# Patient Record
Sex: MALE | Race: WHITE | NOT HISPANIC OR LATINO | URBAN - METROPOLITAN AREA
[De-identification: names, ages, dates, MRNs, and addresses within clinical notes are randomized per-mention and may not be internally consistent; named-entity substitution may affect disease eponyms.]

---

## 2018-08-14 ENCOUNTER — CONVERSION ENCOUNTER (OUTPATIENT)
Dept: GASTROENTEROLOGY | Facility: CLINIC | Age: 38
End: 2018-08-14

## 2018-08-14 ENCOUNTER — OFFICE VISIT CONVERTED (OUTPATIENT)
Dept: GASTROENTEROLOGY | Facility: CLINIC | Age: 38
End: 2018-08-14
Attending: NURSE PRACTITIONER

## 2021-05-16 VITALS
WEIGHT: 181.37 LBS | SYSTOLIC BLOOD PRESSURE: 135 MMHG | HEIGHT: 66 IN | BODY MASS INDEX: 29.15 KG/M2 | DIASTOLIC BLOOD PRESSURE: 87 MMHG

## 2024-02-29 ENCOUNTER — OFFICE VISIT (OUTPATIENT)
Dept: CARDIOLOGY | Facility: CLINIC | Age: 44
End: 2024-02-29
Payer: COMMERCIAL

## 2024-02-29 VITALS
SYSTOLIC BLOOD PRESSURE: 128 MMHG | DIASTOLIC BLOOD PRESSURE: 87 MMHG | HEIGHT: 66 IN | HEART RATE: 96 BPM | BODY MASS INDEX: 37.06 KG/M2 | WEIGHT: 230.6 LBS

## 2024-02-29 DIAGNOSIS — R06.09 DYSPNEA ON EXERTION: ICD-10-CM

## 2024-02-29 DIAGNOSIS — R07.89 CHEST DISCOMFORT: ICD-10-CM

## 2024-02-29 DIAGNOSIS — E66.01 SEVERE OBESITY (BMI 35.0-39.9) WITH COMORBIDITY: ICD-10-CM

## 2024-02-29 DIAGNOSIS — R29.818 SUSPECTED SLEEP APNEA: ICD-10-CM

## 2024-02-29 DIAGNOSIS — E78.2 MIXED DYSLIPIDEMIA: ICD-10-CM

## 2024-02-29 DIAGNOSIS — Z82.49 FAMILY HISTORY OF ISCHEMIC HEART DISEASE (IHD): ICD-10-CM

## 2024-02-29 DIAGNOSIS — I10 ESSENTIAL HYPERTENSION: Primary | ICD-10-CM

## 2024-02-29 RX ORDER — AMLODIPINE BESYLATE 10 MG/1
1 TABLET ORAL DAILY
COMMUNITY
Start: 2023-11-25

## 2024-02-29 RX ORDER — BUSPIRONE HYDROCHLORIDE 15 MG/1
15 TABLET ORAL 3 TIMES DAILY
COMMUNITY

## 2024-02-29 RX ORDER — ROSUVASTATIN CALCIUM 10 MG/1
TABLET, COATED ORAL
COMMUNITY
Start: 2024-02-26

## 2024-02-29 RX ORDER — LOSARTAN POTASSIUM 25 MG/1
TABLET ORAL
COMMUNITY
Start: 2024-02-26

## 2024-02-29 RX ORDER — DULOXETIN HYDROCHLORIDE 60 MG/1
60 CAPSULE, DELAYED RELEASE ORAL 2 TIMES DAILY
COMMUNITY

## 2024-02-29 RX ORDER — ALBUTEROL SULFATE 90 UG/1
AEROSOL, METERED RESPIRATORY (INHALATION)
COMMUNITY
Start: 2024-01-22

## 2024-02-29 NOTE — PROGRESS NOTES
"Chief Complaint  Shortness of Breath, Edema, Hypertension, and Hyperlipidemia      History of Present Illness  James Roa presents to [facility] CARDIOLOGY    This is a very pleasant 43-year-old gentleman with morbid obesity, family history of ischemic heart disease, hypertension and dyslipidemia presents to clinic for cardiac evaluation.  He has been having sporadic episodes of anterior chest discomfort.  It has been stable in pattern.  It is self-limited.  He denies aggravating or relieving factors.  He has dyspnea on moderate effort.  He has no palpitations, presyncope or syncope.    Past medical history: As discussed above.      Current Outpatient Medications:     albuterol sulfate  (90 Base) MCG/ACT inhaler, , Disp: , Rfl:     amLODIPine (NORVASC) 10 MG tablet, Take 1 tablet by mouth Daily., Disp: , Rfl:     busPIRone (BUSPAR) 15 MG tablet, Take 1 tablet by mouth 3 (Three) Times a Day., Disp: , Rfl:     DULoxetine (CYMBALTA) 60 MG capsule, Take 1 capsule by mouth 2 (Two) Times a Day., Disp: , Rfl:     losartan (COZAAR) 25 MG tablet, , Disp: , Rfl:     rosuvastatin (CRESTOR) 10 MG tablet, , Disp: , Rfl:     There are no discontinued medications.  Allergies   Allergen Reactions    Cefaclor Other (See Comments)    Theophylline Other (See Comments)        Social History     Tobacco Use    Smoking status: Former     Types: Cigarettes    Smokeless tobacco: Never   Vaping Use    Vaping Use: Former    Substances: THC   Substance Use Topics    Alcohol use: Defer    Drug use: Never       Family History   Problem Relation Age of Onset    Heart attack Mother     Heart attack Maternal Grandfather         Objective     /87   Pulse 96   Ht 167.6 cm (65.98\")   Wt 105 kg (230 lb 9.6 oz)   BMI 37.24 kg/m²       Physical Exam  Constitutional:       General: Awake. Not in acute distress.     Appearance: Normal appearance.   Neck:      Vascular: No carotid bruit, hepatojugular reflux or " "JVD.   Cardiovascular:      Rate and Rhythm: Normal rate and regular rhythm.      Chest Wall: PMI is not displaced.      Heart sounds: Normal heart sounds, S1 normal and S2 normal. No murmur heard.   No friction rub. No gallop. No S3 or S4 sounds.    Pulmonary:      Effort: Pulmonary effort is normal.      Breath sounds: Normal breath sounds. No wheezing, rhonchi or rales.   Ext.    No edema.   Skin:     General: Skin is warm and dry.      Coloration: Skin is not cyanotic.      Findings: No petechiae or rash.   Neurological:      Mental Status: Alert and oriented x 3  Psychiatric:         Behavior: Behavior is cooperative.       Result Review :     No results found for: \"PROBNP\"       No results found for: \"TSH\"   No results found for: \"FREET4\"   No results found for: \"DDIMERQUANT\"  No results found for: \"MG\"   No results found for: \"DIGOXIN\"   No results found for: \"TROPONINT\"   No results found for: \"POCTROP\"(              ECG 12 Lead    Date/Time: 2/29/2024 12:14 PM  Performed by: Vicente Waggoner MD    Authorized by: Vicente Waggoner MD  Previous ECG: no previous ECG available  Rhythm: sinus rhythm  BPM: 41  Other findings: early transition            No results found for this or any previous visit.                Diagnoses and all orders for this visit:    1. Essential hypertension (Primary)    2. Mixed dyslipidemia    3. Suspected sleep apnea  -     Ambulatory Referral to Sleep Medicine    4. Chest discomfort  -     Adult Transthoracic Echo Complete W/ Cont if Necessary Per Protocol; Future  -     Treadmill Stress Test; Future    5. Dyspnea on exertion  -     Adult Transthoracic Echo Complete W/ Cont if Necessary Per Protocol; Future  -     Treadmill Stress Test; Future    6. Family history of ischemic heart disease (IHD)    7. Severe obesity (BMI 35.0-39.9) with comorbidity    Other orders  -     ECG 12 Lead      Assessment:    Chest discomfort/dyspnea: He has been having sporadic episodes of atypical " chest discomfort and dyspnea on moderate effort as described in HPI.  His exam is benign.  His ECG shows normal sinus rhythm.  He will be scheduled for treadmill stress test to assess for ischemic ST-T changes.  Echo will be done for LVEF, valvular function and PA systolic pressure.  Further recommendations to follow.    Essential hypertension: Stable on current regimen piquantly the same.    Suspected sleep apnea: He will be referred to sleep medicine for further evaluation.    Mixed dyslipidemia: On statin therapy which was recently started.  Continue the same.  Repeat lipid profile in 2 months for follow-up.    Obesity: Lifestyle changes including regular exercise, dietary changes and weight loss were recommended.    Follow Up       Return for Return to clinic after diagnostic testing, With Ernestina JACKSON.    Patient was given instructions and counseling regarding his condition or for health maintenance advice. Please see specific information pulled into the AVS if appropriate.

## 2024-03-11 ENCOUNTER — OFFICE VISIT (OUTPATIENT)
Dept: SLEEP MEDICINE | Facility: HOSPITAL | Age: 44
End: 2024-03-11
Payer: COMMERCIAL

## 2024-03-11 VITALS
HEIGHT: 66 IN | WEIGHT: 231.8 LBS | BODY MASS INDEX: 37.25 KG/M2 | OXYGEN SATURATION: 96 % | HEART RATE: 97 BPM | SYSTOLIC BLOOD PRESSURE: 115 MMHG | DIASTOLIC BLOOD PRESSURE: 74 MMHG

## 2024-03-11 DIAGNOSIS — I10 ESSENTIAL HYPERTENSION, BENIGN: ICD-10-CM

## 2024-03-11 DIAGNOSIS — G47.19 EXCESSIVE DAYTIME SLEEPINESS: ICD-10-CM

## 2024-03-11 DIAGNOSIS — G47.8 NON-RESTORATIVE SLEEP: ICD-10-CM

## 2024-03-11 DIAGNOSIS — G47.30 OBSERVED SLEEP APNEA: Primary | ICD-10-CM

## 2024-03-11 DIAGNOSIS — E66.9 CLASS 2 OBESITY: ICD-10-CM

## 2024-03-11 DIAGNOSIS — R06.83 SNORING: ICD-10-CM

## 2024-03-11 PROBLEM — E66.812 CLASS 2 OBESITY: Status: ACTIVE | Noted: 2024-03-11

## 2024-03-11 PROCEDURE — G0463 HOSPITAL OUTPT CLINIC VISIT: HCPCS

## 2024-03-11 PROCEDURE — 99204 OFFICE O/P NEW MOD 45 MIN: CPT | Performed by: INTERNAL MEDICINE

## 2024-03-11 RX ORDER — LISINOPRIL 5 MG/1
5 TABLET ORAL DAILY
COMMUNITY

## 2024-03-11 NOTE — PROGRESS NOTES
[facility] [address] [address]  Phone: [phone]  Fax: [phone]      James Roa  [id number]   1980  43 y.o.  male      Referring physician/provider, Dr Vicente Waggoner MD  PCP Ivette Montero APRN    Type of service: Initial Sleep Medicine Consult.  Date of service: 3/11/2024      Chief Complaint   Patient presents with    Witnessed Apnea    Snoring    Non-restorative Sleep    Fatigue    Dry Mouth    Daytime Sleepiness    Obesity       History of present illness;  Thank you for asking to see James Roa, 43 y.o.. The patient was seen today on 3/11/2024 at [facility].  The patient presents today with symptoms of snoring, non-restorative sleep and witnessed apneas. The symptoms are present for few years and they are persistent in nature.  The snoring is present in all positions and it is loud.  Patient has no prior surgery namely tonsillectomy, nasal surgery and UPPP.  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    Patient gives the following sleep history.  Sleep schedule:  Bedtime: 12 midnight  Wake time: 8:30 AM  Normally takes about 30-60 minutes to fall asleep  Average hours of sleep 5-6  Number of naps per day none  Symptoms   In addition to snoring, nonrestorative sleep and witnessed apneas patient gives the following associated symptoms.  Have you ever awakened gasping for breath, coughing, choking: Yes   Change in weight,  Yes gained 40 pounds  Morning headaches  No   Awaken with a sore throat or dry mouth  Yes   Leg jerking at night:  Yes   Crawly feeling/urge sensation to move in the legs: No   Teeth grinding:No   Have you ever awakened at night with a sour taste or burning sensation in your chest:  No   Do you have muscle weakness with laughing or anger or sleep paralysis:  No   Have you ever felt paralyzed while going to sleep or waking up:  No   Sleepwalking,  "nightmares, No   Nocturia (urination at night): 3 times per night  Memory Problem:No     MEDICAL CONDITIONS (PMH)   Hypertension  Anxiety and depression    Social history:  Do you drive a commercial vehicle:  No   Shift work:  No   Tobacco use:  no   Alcohol use: 0 per week  Caffeinated drinks: 4    Family Hx (parents and siblings) (pertaining to sleep medicine)  No history of sleep apnea    Medications: reviewed    Review of systems:  Positive symptoms are :  Snoring  Witnessed apnea  Daytime excessive sleepiness with [place] Sleepiness Scale of Total score: 4   Fatigue  Weight gain  Frequent urination      Physical exam:  CONSTITUTINONAL:  Vitals:    03/11/24 1100   BP: 115/74   Pulse: 97   SpO2: 96%   Weight: 105 kg (231 lb 12.8 oz)   Height: 167.6 cm (65.98\")    Body mass index is 37.44 kg/m².   NOSE:no nasal septal defects, nasal passages are clear, no nasal polyps,   THROAT: tonsils are nonenlarged, tongue normal size, oral airway Mallampati class 4  NECK:Neck Circumference: 19 inches, trachea is in the midline, thyroid not enlarged  RESPIRATORY SYSTEM: Breath sounds are normal, there are no wheezes  CARDIOVASULAR SYSTEM: Heart sounds are regular rhythm and normal rate, no edema  NEUROLOGICAL SYSTEM: Oriented x 3, No speech defect, gait is normal  PSYCHIATRIC SYSTEM: Mood is normal, thought content is normal    Office notes from care team reviewed. Office note dated February 29, 2024,reviewed         Assessment and plan:  Witnessed apneas,(R06.81) patient's symptoms and examination is consistent with sleep apnea (G47.30). I have talked to the patient about the signs and symptoms of sleep apnea. In addition, I have also discussed pathophysiology of sleep apnea.  I also discussed the complications of untreated sleep apnea including effects on hypertension, diabetes mellitus and nonrestorative sleep with hypersomnia which can increase risk for motor vehicle accidents.  Untreated sleep apnea is also a risk factor " for development of atrial fibrillation, pulmonary hypertension, and insulin resistance and stroke.  Discussed in detail of various testing methods including home-based and lab based sleep studies.  Based on history and physical examination and other comorbidities the most appropriate study is home sleep test.  The order for the sleep study is placed in [facility].  The test will be scheduled after approval from insurance. Treatment and management will be discussed after the test is completed.  Patient was given opportunity to ask questions and all the questions were answered.   Snoring (R06.83), snoring is the sound created by turbulent airflow vibrating upper airway soft tissue due to limitation of inspiratory airflow. I have also discussed factors affecting snoring including sleep deprivation, sleeping on the back and alcohol ingestion. To minimize snoring, patient is advised to have adequate sleep, sleep on the side and avoid alcohol and sedative medications before bedtime  Obesity 2, patient's BMI is Body mass index is 37.44 kg/m².. I have discussed the relationship between weight and sleep apnea.There is direct correlation between weight and severity of sleep apnea.  Weight reduction is encouraged, as it is going to reduce the severity of sleep apnea. I have also discussed with the patient diet and exercise to achieve ideal body weight.  Hypertension,   Essential hypertension is highly correlated with sleep apnea. Treating sleep apnea will assist in good blood pressure control.    I have also discussed with the patient the following  Sleep hygiene: Maintaining a regular bedtime and wake time, not to watch television or work in bed, limit caffeine-containing beverages before bed time and avoid naps during the day  Adequate amount of sleep.  Generally most people needs about 7 to 8 hours of sleep.    Return for 31 to 90 days after PAP setup with down load..  Patient's questions were answered      I once again thank you  for asking me to see this patient in consultation and I have forwarded my opinion and treatment plan.  Please do not hesitate to call me if you have any questions.   3/11/2024  Ryland Sharma MD  Sleep Medicine  Medical Director  [facility]: [facility]

## 2024-04-15 ENCOUNTER — HOSPITAL ENCOUNTER (OUTPATIENT)
Dept: SLEEP MEDICINE | Facility: HOSPITAL | Age: 44
End: 2024-04-15
Payer: COMMERCIAL

## 2024-04-15 DIAGNOSIS — G47.30 OBSERVED SLEEP APNEA: ICD-10-CM

## 2024-04-15 DIAGNOSIS — G47.19 EXCESSIVE DAYTIME SLEEPINESS: ICD-10-CM

## 2024-04-15 DIAGNOSIS — I10 ESSENTIAL HYPERTENSION, BENIGN: ICD-10-CM

## 2024-04-15 DIAGNOSIS — G47.8 NON-RESTORATIVE SLEEP: ICD-10-CM

## 2024-04-15 DIAGNOSIS — R06.83 SNORING: ICD-10-CM

## 2024-04-15 DIAGNOSIS — E66.9 CLASS 2 OBESITY: ICD-10-CM

## 2024-04-15 PROCEDURE — 95806 SLEEP STUDY UNATT&RESP EFFT: CPT

## 2024-04-22 DIAGNOSIS — R06.83 SNORING: ICD-10-CM

## 2024-04-22 DIAGNOSIS — G47.33 OSA (OBSTRUCTIVE SLEEP APNEA): Primary | ICD-10-CM

## 2024-04-22 PROCEDURE — 95806 SLEEP STUDY UNATT&RESP EFFT: CPT | Performed by: INTERNAL MEDICINE

## 2024-04-23 ENCOUNTER — HOSPITAL ENCOUNTER (OUTPATIENT)
Dept: CARDIOLOGY | Facility: HOSPITAL | Age: 44
Discharge: HOME | End: 2024-04-23
Payer: COMMERCIAL

## 2024-04-23 DIAGNOSIS — R07.89 CHEST DISCOMFORT: ICD-10-CM

## 2024-04-23 DIAGNOSIS — R06.09 DYSPNEA ON EXERTION: ICD-10-CM

## 2024-04-24 ENCOUNTER — TELEPHONE (OUTPATIENT)
Dept: SLEEP MEDICINE | Facility: HOSPITAL | Age: 44
End: 2024-04-24
Payer: COMMERCIAL

## 2024-04-24 NOTE — TELEPHONE ENCOUNTER
Spoke with patient about sleep study. Patient will use [employer]. Submitted successfully. Follow up made.

## 2024-06-03 ENCOUNTER — HOSPITAL ENCOUNTER (OUTPATIENT)
Dept: OTHER | Facility: HOSPITAL | Age: 44
Discharge: HOME | End: 2024-06-03

## 2024-08-13 ENCOUNTER — TELEPHONE (OUTPATIENT)
Dept: UROLOGY | Facility: CLINIC | Age: 44
End: 2024-08-13
Payer: COMMERCIAL

## 2024-08-13 NOTE — TELEPHONE ENCOUNTER
Called and spoke to pt ; he stated he has passed his kidney stone.   He states he is working today and can not come in, he leaves for vacation tomorrow and will not be back until 8/21/24. Please put pt on the schedule for 8/22/2024 at 10:00am.  He is aware of this appointment.

## 2024-08-20 PROBLEM — K57.90 DIVERTICULOSIS: Status: ACTIVE | Noted: 2024-08-20

## 2024-08-20 PROBLEM — F41.9 ANXIETY AND DEPRESSION: Status: ACTIVE | Noted: 2024-08-20

## 2024-08-20 PROBLEM — F32.A ANXIETY AND DEPRESSION: Status: ACTIVE | Noted: 2024-08-20

## 2024-08-20 RX ORDER — IBUPROFEN 800 MG/1
800 TABLET ORAL
COMMUNITY
Start: 2024-04-17

## 2024-08-20 RX ORDER — TAMSULOSIN HYDROCHLORIDE 0.4 MG/1
CAPSULE ORAL
COMMUNITY
Start: 2024-06-03

## 2024-08-20 RX ORDER — LOSARTAN POTASSIUM 25 MG/1
TABLET ORAL
COMMUNITY
Start: 2024-07-09

## 2024-08-20 RX ORDER — HYDROCODONE BITARTRATE AND ACETAMINOPHEN 5; 325 MG/1; MG/1
TABLET ORAL
COMMUNITY
Start: 2024-06-03

## 2024-08-22 ENCOUNTER — OFFICE VISIT (OUTPATIENT)
Dept: UROLOGY | Facility: CLINIC | Age: 44
End: 2024-08-22
Payer: COMMERCIAL

## 2024-08-22 VITALS
SYSTOLIC BLOOD PRESSURE: 145 MMHG | BODY MASS INDEX: 36.96 KG/M2 | HEIGHT: 66 IN | WEIGHT: 230 LBS | DIASTOLIC BLOOD PRESSURE: 86 MMHG

## 2024-08-22 DIAGNOSIS — N28.9 RENAL LESION: ICD-10-CM

## 2024-08-22 DIAGNOSIS — N20.0 KIDNEY STONE: Primary | ICD-10-CM

## 2024-08-22 NOTE — PROGRESS NOTES
UROLOGY OFFICE H&P NOTE    Subjective   HPI  James Roa is a 44 y.o. male.  Presents for evaluation of CT scan after presentation outside ER with left acute flank pain.  Diagnosed with 2 mm obstructive stone in the distal ureter and bilateral stones.  Also incidental finding of a 1 cm hyperdense mass lower pole left kidney.  History of Present Illness  He sought emergency care due to severe pain, initially suspecting a flare-up of his diverticulosis. However, a ureteral stone was discovered, which he has since passed.     A questionable spot on one of his kidneys was also identified.     He was informed about the presence of several stones in his scan 6 years ago but does not recall passing any.    He maintains a high fluid intake, primarily consuming sugar-free sodas and flavor packets, and drinks water sparingly. His urine is usually clear or nearly cleark in [place] where he was able to drink more fluids.      __________________  CT abdomen pelvis without contrast 6/3/2024 ([facility]): Bilateral small nonobstructing renal stones.  2 mm obstructing stone at the distal left ureter just proximal to the ureterovesical junction with mild ureteral dilatation and hydronephrosis. 1 cm round mass of increased attenuation within the lower pole left kidney likely consistent with a hyperdense (hemorrhagic or proteinaceous) cyst. Further characterization with follow-up renal mass protocol CT is recommended.         Medical History:  Past Medical History:   Diagnosis Date    Anxiety and depression     Hypertension         Social History:  Social History     Socioeconomic History    Marital status: Single   Tobacco Use    Smoking status: Former     Current packs/day: 0.00     Types: Cigarettes     Quit date:      Years since quittin.6    Smokeless tobacco: Never   Vaping Use    Vaping status: Former    Substances: THC    Devices: Disposable   Substance and Sexual Activity    Alcohol use: Defer    Drug  "use: Never    Sexual activity: Defer        Family History:  Family History   Problem Relation Age of Onset    Heart attack Mother     Heart attack Maternal Grandfather         Surgical History:  History reviewed. No pertinent surgical history.     Allergies:  Allergies   Allergen Reactions    Cefaclor Other (See Comments)    Theophylline Other (See Comments)        Current Medications:  Current Outpatient Medications   Medication Sig Dispense Refill    albuterol sulfate  (90 Base) MCG/ACT inhaler Inhale 2 puffs As Needed for Wheezing or Shortness of Air.      amLODIPine (NORVASC) 10 MG tablet Take 1 tablet by mouth Daily.      busPIRone (BUSPAR) 15 MG tablet Take 1 tablet by mouth 3 (Three) Times a Day.      DULoxetine (CYMBALTA) 60 MG capsule Take 1 capsule by mouth 2 (Two) Times a Day.      ibuprofen (ADVIL,MOTRIN) 800 MG tablet Take 1 tablet by mouth.      lisinopril (PRINIVIL,ZESTRIL) 5 MG tablet Take 1 tablet by mouth Daily.      losartan (COZAAR) 25 MG tablet       rosuvastatin (CRESTOR) 10 MG tablet       HYDROcodone-acetaminophen (NORCO) 5-325 MG per tablet  (Patient not taking: Reported on 8/22/2024)      tamsulosin (FLOMAX) 0.4 MG capsule 24 hr capsule  (Patient not taking: Reported on 8/22/2024)       No current facility-administered medications for this visit.       Review of systems  A review of systems was performed, and positive findings are noted in the HPI.    Objective     Vital Signs:   /86   Ht 167.6 cm (65.98\")   Wt 104 kg (230 lb)   BMI 37.15 kg/m²       Physical exam  No acute distress, well-nourished  Lungs: Clear, unlabored  CV: Regular rate, no chest retractions  Awake alert and oriented  Mood normal; affect normal  Physical Exam        Problem List:  Patient Active Problem List   Diagnosis    Observed sleep apnea    Non-restorative sleep    Snoring    Excessive daytime sleepiness    Class 2 obesity    Essential hypertension, benign    Anxiety and depression    " Diverticulosis       Assessment & Plan   Diagnoses and all orders for this visit:    1. Kidney stone (Primary)    2. Renal lesion  -     CT Abdomen With & Without Contrast; Future      CT scan imaging personally reviewed by me, demonstrated discussed with patient at length.  Assessment & Plan  Renal lesion, left lower pole, 1 cm  The renal mass could be protein deposits or an old bruise of the kidney, such as a hemorrhagic cyst.  Aware that malignancy is within the differential diagnosis.  Further workup is indicated.  Discussed limitations of noncontrasted imaging.    Warrants contrasted imaging for characterization of this lesion and determine to proceed.      Ureteral stone-patient successfully passed his ureteral stone  Has other tiny nonobstructing intrarenal stones, no intervention necessary; believe he will likely pass these without surgical intervention.      He was advised to increase fluid intake, reduce salt or sodium consumption, and incorporate citrus into his diet to prevent further stone formation. A routine kidney stone packet or handout will be provided for additional information.    Follow-up  He will follow up in early October 2024, contrasted CT scan prior         All questions addressed      Patient or patient representative verbalized consent for the use of Ambient Listening during the visit with  Maye Chua MD for chart documentation. 8/23/2024  14:05 EDT

## 2024-09-26 ENCOUNTER — HOSPITAL ENCOUNTER (OUTPATIENT)
Dept: CT IMAGING | Facility: HOSPITAL | Age: 44
Discharge: HOME | End: 2024-09-26
Admitting: UROLOGY
Payer: COMMERCIAL

## 2024-09-26 DIAGNOSIS — N28.9 RENAL LESION: ICD-10-CM

## 2024-09-26 PROCEDURE — 25510000001 IOPAMIDOL PER 1 ML: Performed by: UROLOGY

## 2024-09-26 PROCEDURE — 74170 CT ABD WO CNTRST FLWD CNTRST: CPT

## 2024-09-26 RX ORDER — IOPAMIDOL 755 MG/ML
100 INJECTION, SOLUTION INTRAVASCULAR
Status: COMPLETED | OUTPATIENT
Start: 2024-09-26 | End: 2024-09-26

## 2024-09-26 RX ADMIN — IOPAMIDOL 100 ML: 755 INJECTION, SOLUTION INTRAVENOUS at 10:56

## 2024-09-30 ENCOUNTER — TELEPHONE (OUTPATIENT)
Dept: UROLOGY | Facility: CLINIC | Age: 44
End: 2024-09-30
Payer: COMMERCIAL

## 2024-09-30 NOTE — TELEPHONE ENCOUNTER
Hub staff attempted to follow warm transfer process and was unsuccessful     Caller: James Roa    Relationship to patient: Self    Best call back number: [phone]    Patient is needing: PT CALLED TO GET RESCHEDULE. OFFICE PLEASE ADVISE.THANK YOU.

## 2024-10-01 NOTE — TELEPHONE ENCOUNTER
CALLED PATIENT TO OFFER 10/29 @ 10AM SPOKE W/ PATIENT AND HE NEEDED A WEDNESDAY.     SCHEDULED     Future Appointments         Provider Department Center    10/30/2024 10:30 AM Maye Chua MD [facility] UROLOGY [facility]

## 2024-10-30 ENCOUNTER — OFFICE VISIT (OUTPATIENT)
Dept: UROLOGY | Age: 44
End: 2024-10-30
Payer: COMMERCIAL

## 2024-10-30 VITALS
DIASTOLIC BLOOD PRESSURE: 93 MMHG | WEIGHT: 228.2 LBS | HEART RATE: 94 BPM | BODY MASS INDEX: 36.85 KG/M2 | SYSTOLIC BLOOD PRESSURE: 126 MMHG

## 2024-10-30 DIAGNOSIS — N28.9 RENAL LESION: Primary | ICD-10-CM

## 2024-10-30 DIAGNOSIS — N20.0 KIDNEY STONE: ICD-10-CM

## 2024-10-30 NOTE — PROGRESS NOTES
UROLOGY OFFICE FOLLOW UP NOTE    Subjective   HPI  James Roa is a 44 y.o. male. Presents for evaluation of CT scan after presentation outside ER with left acute flank pain.  Diagnosed with 2 mm obstructive stone in the distal ureter and bilateral stones.  Also incidental finding of a 1 cm hyperdense mass lower pole left kidney.  History of Present Illness  He sought emergency care due to severe pain, initially suspecting a flare-up of his diverticulosis. However, a ureteral stone was discovered, which he has since passed.      A questionable spot on one of his kidneys was also identified.      He was informed about the presence of several stones in his scan 6 years ago but does not recall passing any.     He maintains a high fluid intake, primarily consuming sugar-free sodas and flavor packets, and drinks water sparingly. His urine is usually clear or nearly cleark in [place] where he was able to drink more fluids.    Update 10/30/2024:   Presents for follow-up left renal mass, nephrolithiasis with CT scan prior.  History of Present Illness  The patient presents for follow-up on CT scan.    He has reviewed the results of his CT scan via [employer]. He expresses concern about potential issues with his gallbladder and a specific section of his lung, the lingula. He is curious if these findings could necessitate  intervention.      __________________  CT abdomen with and without contrast 9/26/2024:  Benign hemorrhagic or proteinaceous 1.1 cm cyst arising from the posterior lower pole of the left kidney. This has intrinsic hyperdensity and is without associated enhancement after contrast.  2. Small bilateral nonobstructing renal stones measuring up to 2 mm.    CT abdomen pelvis without contrast 6/3/2024 ([facility]): Bilateral small nonobstructing renal stones.  2 mm obstructing stone at the distal left ureter just proximal to the ureterovesical junction with mild ureteral dilatation and hydronephrosis. 1  cm round mass of increased attenuation within the lower pole left kidney likely consistent with a hyperdense (hemorrhagic or proteinaceous) cyst. Further characterization with follow-up renal mass protocol CT is recommended.      No results found for this or any previous visit.      Review of systems  A review of systems was performed, and positive findings are noted in the HPI.    Objective     Vital Signs:   /93   Pulse 94   Wt 104 kg (228 lb 3.2 oz)   BMI 36.85 kg/m²       Physical exam  No acute distress, well-nourished  Awake alert and oriented  Mood normal; affect normal  Physical Exam        Results  CT ABDOMEN W WO CONTRAST     Date of Exam: 9/26/2024 10:47 AM EDT     Indication: Left renal mass, compare noncontrasted CT ([facility]), follow up renal mass or complex cyst.     Comparison: CT abdomen and pelvis dated 6/3/2024 from [facility].     Technique: Axial CT images were obtained of the abdomen before and after the uneventful intravenous administration of iodinated contrast. Reconstructed coronal and sagittal images were also obtained. Automated exposure control and iterative construction   methods were used.        Findings:  Genitourinary system:   Noncontrast portion of the examination demonstrate small bilateral nonobstructing renal stones measuring up to 2 mm within the lower pole of the right kidney and multiple 2 mm stones within the left kidney. Within the inferior aspect of the left kidney   there is a 1.1 cm partially exophytic lesion which is intrinsically hyperdense with Hounsfield unit density of 87 on precontrast imaging. This is compatible with a hemorrhagic or proteinaceous cyst. The postcontrast Hounsfield unit density is 94 and the   delayed Hounsfield unit density is 93. This is compatible with a benign hemorrhagic or proteinaceous cyst. There is no evidence of internal enhancement. There is no hydronephrosis. There is no abnormal urothelial  thickening on delayed postcontrast   imaging.     Remainder of the abdomen: The heart size is normal. There is no pericardial effusion. There is linear bandlike atelectasis within the lingula. There is no pleural effusion or pneumothorax.     The liver is normal in size and contour. The gallbladder is collapsed. There is no intrahepatic or extrahepatic biliary ductal dilatation. The spleen is normal in size. The adrenal glands and pancreas appear within normal limits.     The visualized portions of the gastrointestinal tract appear normal in caliber. There is no evidence of small bowel obstruction or small bowel inflammation. The appendix is visualized and normal within the right lower quadrant. There is no acute colitis   within the included field-of-view. The aorta is normal in caliber without aneurysm formation. There is no mesenteric, or upper retroperitoneal adenopathy. There is no acute osseous abnormality.     IMPRESSION:  Impression:  1. Benign hemorrhagic or proteinaceous 1.1 cm cyst arising from the posterior lower pole of the left kidney. This has intrinsic hyperdensity and is without associated enhancement after contrast.  2. Small bilateral nonobstructing renal stones measuring up to 2 mm.           Electronically Signed: Kaiden Powell    9/27/2024 4:35 PM EDT    Workstation ID: [id number]      Problem List:  Patient Active Problem List   Diagnosis    Observed sleep apnea    Non-restorative sleep    Snoring    Excessive daytime sleepiness    Class 2 obesity    Essential hypertension, benign    Anxiety and depression    Diverticulosis       Assessment & Plan   Diagnoses and all orders for this visit:    1. Kidney stone (Primary)    2. Renal lesion      CT scan imaging personally reviewed by me, demonstrated To patient  Provided reassurance    No renal masses, concerning lesions  Left hemorrhagic cyst, Bosniak 2.  Does not warrant dedicated imaging, additional workup, or urologic  management.    Nephrolithiasis-tiny nonobstructing intrarenal stones versus calcium deposits within the papilla; uncertain if these are accessible or true free-floating stones.  All are of a size that I would expect him to pass without surgical intervention.  Recommend expectant management.  Given size, not eligible for following stone burden with KUB; would not risk radiation exposure to follow-up via CT scan.  Patient to continue to observe and monitor; should he experience spectated ureteral colic, additional imaging is recommended.  Again discussed dietary modifications to reduce stone growth and recurrence.    Assessment & Plan    Nonurologic CT scan findings-ronn briefly with patient however notes understanding this is outside my area of expertise.  He is advised to discuss this finding with his primary care physician for further evaluation.             to follow-up as needed all questions addressed      Patient or patient representative verbalized consent for the use of Ambient Listening during the visit with  Maye Chua MD for chart documentation. 10/31/2024  06:49 EDT